# Patient Record
Sex: MALE | URBAN - METROPOLITAN AREA
[De-identification: names, ages, dates, MRNs, and addresses within clinical notes are randomized per-mention and may not be internally consistent; named-entity substitution may affect disease eponyms.]

---

## 2023-02-16 ENCOUNTER — OFFICE VISIT (OUTPATIENT)
Age: 53
End: 2023-02-16

## 2023-02-16 VITALS — WEIGHT: 218 LBS | BODY MASS INDEX: 27.1 KG/M2 | HEIGHT: 75 IN

## 2023-02-16 DIAGNOSIS — S63.501A SPRAIN OF RIGHT FOREARM, INITIAL ENCOUNTER: Primary | ICD-10-CM

## 2023-02-16 DIAGNOSIS — M25.531 RIGHT WRIST PAIN: ICD-10-CM

## 2023-02-16 RX ORDER — FAMOTIDINE 20 MG/1
TABLET, FILM COATED ORAL
COMMUNITY
Start: 2022-12-20

## 2023-02-16 RX ORDER — AMLODIPINE BESYLATE 10 MG/1
TABLET ORAL
COMMUNITY
Start: 2022-12-20

## 2023-02-16 NOTE — PROGRESS NOTES
Name: Darrion Johnson    : 1970     Chief Complaint   Patient presents with    Wrist Pain        Ht 6' 3\" (1.905 m)   Wt 218 lb (98.9 kg)   BMI 27.25 kg/m²      No Known Allergies     Current Outpatient Medications   Medication Sig Dispense Refill    amLODIPine (NORVASC) 10 MG tablet take 1 tablet by mouth once daily      famotidine (PEPCID) 20 MG tablet take 1 tablet by mouth twice a day 90       No current facility-administered medications for this visit. There is no problem list on file for this patient. Family History   Problem Relation Age of Onset    No Known Problems Mother     No Known Problems Father       Social History     Socioeconomic History    Marital status:      Spouse name: None    Number of children: None    Years of education: None    Highest education level: None      Past Surgical History:   Procedure Laterality Date    APPENDECTOMY        Past Medical History:   Diagnosis Date    Hypertension         I have reviewed and agree with [address] [place] [address] Nw and ROS and intake form in chart and the record furthermore I have reviewed prior medical record(s) regarding this patients care during this appointment. Review of Systems:   Patient is a pleasant appearing individual, appropriately dressed, well hydrated, well nourished, who is alert, appropriately oriented for age, and in no acute distress with a normal gait and normal affect who does not appear to be in any significant pain. Physical Exam:  Right wrist - grossly neurovascularly intact, good cap refill, positive tenderness to palpation, positive soft tissue swelling, decreased range of motion, decreased strength, skin intact. Left wrist- Grossly neurovascularly intact, good cap refill, full range of motion, no weakness, no swelling, no point tenderness, no skin lesions. Please note that a DME was provided from our office and fitted to an appropriate size.  DME provided will help decrease soft tissue swelling, assist with stabilization, and decrease pain with immobilization. [facility] will bill for DME     Encounter Diagnoses   Name Primary? Sprain of right forearm, initial encounter Yes    Right wrist pain        HPI:  The patient is here with right wrist pain, throbbing, burning pain. Pain is 3/10. X-rays are positive for questionable triquetral fracture, but nothing significant. Assessment/Plan:  Pain is actually improving already. He wants to go back to work. So, we will let him do that. Continue with splint that we gave him. No restrictions in the meantime. If he gets worse, he is to give me a call, and we will see him back in about 3 to 4 weeks for more of a clinical exam and go from there. As part of continued conservative pain management options the patient was advised to utilize Tylenol or OTC NSAIDS as long as it is not medically contraindicated. Return to Office: Follow-up and Dispositions    Return in about 4 weeks (around 3/16/2023) for JASON PRITCHARD. Scribed by Rosie Hicks LPN as dictated by [facility] MAZIN Penny MD.  Documentation, performed by, True and Accepted Terrance Penny MD

## 2023-02-16 NOTE — PATIENT INSTRUCTIONS
Wrist Sprain: Care Instructions  Overview     Your wrist hurts because you have stretched or torn ligaments, which connect the bones in your wrist.  Wrist sprains usually take from 2 to 10 weeks to heal, but some take longer. Usually, the more pain you have, the more severe your wrist sprain is and the longer it will take to heal. You can heal faster and regain strength in your wrist with good home treatment. Follow-up care is a key part of your treatment and safety. Be sure to make and go to all appointments, and call your doctor if you are having problems. It's also a good idea to know your test results and keep a list of the medicines you take. How can you care for yourself at home? Prop up your arm on a pillow when you ice it or anytime you sit or lie down for the next 3 days. Try to keep your wrist above the level of your heart. This will help reduce swelling. Put ice or cold packs on your wrist for 10 to 20 minutes at a time. Try to do this every 1 to 2 hours for the next 3 days (when you are awake) or until the swelling goes down. Put a thin cloth between the ice pack and your skin. After 2 or 3 days, if your swelling is gone, apply a heating pad set on low or a warm cloth to your wrist. This helps keep your wrist flexible. Some doctors suggest that you go back and forth between hot and cold. If you have an elastic bandage, keep it on for the next 24 to 36 hours. The bandage should be snug but not so tight that it causes numbness or tingling. To rewrap the wrist, wrap the bandage around the hand a few times, beginning at the fingers. Then wrap it around the hand between the thumb and index finger, ending by circling the wrist several times. If your doctor gave you a splint or brace, wear it as directed to protect your wrist until it has healed. Take pain medicines exactly as directed. If the doctor gave you a prescription medicine for pain, take it as prescribed.   If you are not taking a prescription pain medicine, ask your doctor if you can take an over-the-counter medicine. Try not to use your injured wrist and hand. When should you call for help? Call your doctor now or seek immediate medical care if:    Your hand or fingers are cool or pale or change color. Watch closely for changes in your health, and be sure to contact your doctor if:    You do not get better as expected. Where can you learn more? Go to http://www.woods.com/ and enter G541 to learn more about \"Wrist Sprain: Care Instructions. \"  Current as of: March 9, 2022               Content Version: 13.5  © 3261-0782 [employer]. Care instructions adapted under license by [facility] ([facility]). If you have questions about a medical condition or this instruction, always ask your healthcare professional. [address] any warranty or liability for your use of this information.

## 2023-02-16 NOTE — LETTER
RX/DWO/POD  DOS: 2/16/2023  Mata Mc          1970   [facility]#[id number]                        Deandra Hamm                                                                                           [facility]# [id number]  Wrist                     Foot/Ankle                         Knee                Wrist Splint            Cam Boot                         Knee Immobilizer    Thumb Spica         Lace Up Ankle Strap       J Sleeve                                  Night Time Splint             T- Scope ROM Brace          Short Runner                                                                           OA Brace   SHOULDER    Sling    Sling w/ Abduction Pillow    ELBOW    Elbow T-Scope ROM Brace    Back    Back Brace    CRUTCHES    Left    Right    __________ Size              IDC 10 Code:____________    I hereby acknowledge receipt of the above listed equipment. I acknowledge that the equipment is in good working order. I have received instructions on safe and proper use of the equipment, including cleaning and maintenance requirements, to my complete satisfaction. I also understand that this product is not able to be returned and is non refundable. I hereby request that payment of authorized Medicare/ third party insurance benefits be made on my behalf of [address] [place] [address] for assigned claims for any authorized equipment/product furnished by [facility].  Having read the foregoing terms and conditions of the agreement on this page , I do hereby agree to be bound thereby.          ____________________________________________________________  Patient/Legal Guardian Signature         Date            Representative Name

## 2023-02-16 NOTE — LETTER
2/16/2023      RE: Torres Fields      To Whom It May Concern,    This is to certify that Torres Fields may return to work 2/21/23 with no restrictions. Please feel free to contact my office if you have any questions or concerns. Sincerely,  Terrance Isidro MD